# Patient Record
Sex: FEMALE | Race: WHITE | Employment: FULL TIME | URBAN - METROPOLITAN AREA
[De-identification: names, ages, dates, MRNs, and addresses within clinical notes are randomized per-mention and may not be internally consistent; named-entity substitution may affect disease eponyms.]

---

## 2017-06-08 ENCOUNTER — TELEPHONE (OUTPATIENT)
Dept: SURGERY | Facility: CLINIC | Age: 47
End: 2017-06-08

## 2017-06-08 RX ORDER — FLUCONAZOLE 150 MG/1
150 TABLET ORAL DAILY
Qty: 10 TABLET | Refills: 0 | Status: SHIPPED | OUTPATIENT
Start: 2017-06-08 | End: 2017-06-18

## 2019-01-03 ENCOUNTER — HOSPITAL (OUTPATIENT)
Dept: OTHER | Age: 49
End: 2019-01-03

## 2021-02-10 DIAGNOSIS — Z23 NEED FOR VACCINATION: ICD-10-CM

## 2021-05-10 ENCOUNTER — APPOINTMENT (OUTPATIENT)
Dept: CT IMAGING | Facility: HOSPITAL | Age: 51
End: 2021-05-10
Attending: EMERGENCY MEDICINE
Payer: COMMERCIAL

## 2021-05-10 ENCOUNTER — HOSPITAL ENCOUNTER (EMERGENCY)
Facility: HOSPITAL | Age: 51
Discharge: HOME | End: 2021-05-10
Attending: EMERGENCY MEDICINE
Payer: COMMERCIAL

## 2021-05-10 VITALS
WEIGHT: 192 LBS | HEIGHT: 72 IN | RESPIRATION RATE: 22 BRPM | HEART RATE: 90 BPM | BODY MASS INDEX: 26.01 KG/M2 | DIASTOLIC BLOOD PRESSURE: 74 MMHG | TEMPERATURE: 98 F | OXYGEN SATURATION: 100 % | SYSTOLIC BLOOD PRESSURE: 155 MMHG

## 2021-05-10 DIAGNOSIS — H81.10 BENIGN PAROXYSMAL POSITIONAL VERTIGO, UNSPECIFIED LATERALITY: ICD-10-CM

## 2021-05-10 DIAGNOSIS — J01.00 ACUTE MAXILLARY SINUSITIS, RECURRENCE NOT SPECIFIED: Primary | ICD-10-CM

## 2021-05-10 PROCEDURE — 70496 CT ANGIOGRAPHY HEAD: CPT | Performed by: EMERGENCY MEDICINE

## 2021-05-10 PROCEDURE — 80053 COMPREHEN METABOLIC PANEL: CPT | Performed by: EMERGENCY MEDICINE

## 2021-05-10 PROCEDURE — 93005 ELECTROCARDIOGRAM TRACING: CPT

## 2021-05-10 PROCEDURE — 93010 ELECTROCARDIOGRAM REPORT: CPT

## 2021-05-10 PROCEDURE — 99284 EMERGENCY DEPT VISIT MOD MDM: CPT

## 2021-05-10 PROCEDURE — 36415 COLL VENOUS BLD VENIPUNCTURE: CPT

## 2021-05-10 PROCEDURE — 85025 COMPLETE CBC W/AUTO DIFF WBC: CPT | Performed by: EMERGENCY MEDICINE

## 2021-05-10 RX ORDER — MECLIZINE HYDROCHLORIDE 25 MG/1
25 TABLET ORAL ONCE
Status: COMPLETED | OUTPATIENT
Start: 2021-05-10 | End: 2021-05-10

## 2021-05-10 RX ORDER — AMOXICILLIN 875 MG/1
875 TABLET, COATED ORAL 2 TIMES DAILY
Qty: 20 TABLET | Refills: 0 | Status: SHIPPED | OUTPATIENT
Start: 2021-05-10 | End: 2021-05-20

## 2021-05-10 RX ORDER — MECLIZINE HYDROCHLORIDE 25 MG/1
25 TABLET ORAL 3 TIMES DAILY PRN
Qty: 20 TABLET | Refills: 0 | Status: SHIPPED | OUTPATIENT
Start: 2021-05-10

## 2021-05-10 NOTE — ED INITIAL ASSESSMENT (HPI)
Vertigo with room spinning and nausea at approx 1330 / pt states she tried to sit down and rest but now head is pounding with vomiting / patient reports abnormal CT scan of head in the past winter but never followed up on it

## 2021-05-10 NOTE — ED PROVIDER NOTES
.  Patient Seen in: [facility] Emergency Department      History   Patient presents with:  Dizziness  Hypertension    Stated Complaint: dizzy, htn     HPI/Subjective:   HPI    51-year-old female complaint of dizzy patient describes that today she work negative except as noted above.     Physical Exam     ED Triage Vitals [05/10/21 1714]   BP (!) 155/105   Pulse 94   Resp 18   Temp 97.6 °F (36.4 °C)   Temp src Temporal   SpO2 97 %   O2 Device None (Room air)       Current:/74   Pulse 90   Temp 97.6 Normal EKG              CTA BRAIN ([id number])    Result Date: 5/10/2021  CONCLUSION:  Intracranial CT angiogram demonstrates no large branch occlusion or aneurysm. There is sinus inflammatory disease in left maxillary sinus.    Dictated by (CST): Tyrone Joseph

## 2021-07-01 ENCOUNTER — OFFICE VISIT (OUTPATIENT)
Dept: RHEUMATOLOGY | Facility: CLINIC | Age: 51
End: 2021-07-01
Payer: COMMERCIAL

## 2021-07-01 VITALS
WEIGHT: 198 LBS | DIASTOLIC BLOOD PRESSURE: 82 MMHG | RESPIRATION RATE: 16 BRPM | TEMPERATURE: 98 F | HEIGHT: 72 IN | SYSTOLIC BLOOD PRESSURE: 142 MMHG | BODY MASS INDEX: 26.82 KG/M2 | HEART RATE: 88 BPM

## 2021-07-01 DIAGNOSIS — I73.00 RAYNAUD'S DISEASE WITHOUT GANGRENE: ICD-10-CM

## 2021-07-01 DIAGNOSIS — M47.812 OSTEOARTHRITIS OF CERVICAL SPINE, UNSPECIFIED SPINAL OSTEOARTHRITIS COMPLICATION STATUS: ICD-10-CM

## 2021-07-01 DIAGNOSIS — R06.81 APNEA: ICD-10-CM

## 2021-07-01 DIAGNOSIS — F41.9 ANXIETY: ICD-10-CM

## 2021-07-01 DIAGNOSIS — M79.7 FIBROMYALGIA: ICD-10-CM

## 2021-07-01 DIAGNOSIS — R76.8 POSITIVE ANA (ANTINUCLEAR ANTIBODY): Primary | ICD-10-CM

## 2021-07-01 DIAGNOSIS — F32.A DEPRESSION, UNSPECIFIED DEPRESSION TYPE: ICD-10-CM

## 2021-07-01 DIAGNOSIS — H81.12 BENIGN PAROXYSMAL POSITIONAL VERTIGO OF LEFT EAR: ICD-10-CM

## 2021-07-01 PROCEDURE — 3008F BODY MASS INDEX DOCD: CPT | Performed by: INTERNAL MEDICINE

## 2021-07-01 PROCEDURE — 99244 OFF/OP CNSLTJ NEW/EST MOD 40: CPT | Performed by: INTERNAL MEDICINE

## 2021-07-01 PROCEDURE — 3079F DIAST BP 80-89 MM HG: CPT | Performed by: INTERNAL MEDICINE

## 2021-07-01 PROCEDURE — 3077F SYST BP >= 140 MM HG: CPT | Performed by: INTERNAL MEDICINE

## 2021-07-01 RX ORDER — DEXTROAMPHETAMINE SACCHARATE, AMPHETAMINE ASPARTATE MONOHYDRATE, DEXTROAMPHETAMINE SULFATE AND AMPHETAMINE SULFATE 3.75; 3.75; 3.75; 3.75 MG/1; MG/1; MG/1; MG/1
15 CAPSULE, EXTENDED RELEASE ORAL DAILY
COMMUNITY
Start: 2021-02-23 | End: 2021-11-08 | Stop reason: ALTCHOICE

## 2021-07-01 NOTE — PATIENT INSTRUCTIONS
For sleep study: Edward scheduling line to set the appointment up. Phone number is [phone]. Talk to Dr. Tamra Capone about effexor for hot flashes/anxiety;  Ok to wait after seeing psychiatry      Raynaud Disease  Your healthcare provider has told yo disease include:  · Women are more likely to get Raynaud disease than men. · Younger people are at higher risk, usually ages 13 to 27. · Living in colder climates increases risk. · Having a family member with Raynaud disease increases your risk.   · Unde vessels, such as calcium channel blockers. These may help relieve symptoms. · Nerve surgery. This is used for severe cases that don’t respond to other treatments. Surgery removes the nerves that surround the blood vessels in the hands and feet.  Without ne

## 2021-07-01 NOTE — PROGRESS NOTES
Rheumatology New Patient Note  =====================================================================================================      Date of visit: 7/1/2021  ? Patient presents with:  Establish Care: New pt.  Dr. Toya Rico referral. Nga Diane treated for insufficiency), or blood clots. 14 point ROS negative except noted above    Medications:  Amphetamine-Dextroamphet ER 15 MG Oral Capsule SR 24 Hr, Take 15 mg by mouth daily. , Disp: , Rfl:   methylphenidate 10 MG Oral Tab, Take 1 tablet by mouth 2 (two) midline with no lesions. The oral cavity is clear. Neck: Supple. No neck masses. No thyromegaly. No LAD, parotid or submandicular gland palpated.    CV: RRR, no mrg, S1/S2  PULM: CTAB, no wrr, easy effort  Abd: s/nt/nd  Extremities: No cyanosis, edema or SM, RNP, ANTIRNP, SMITHRNP  No results found for: SCL70, SCL, PNOXZHD79  No results found for: C3, C4  No results found for: DRVVT, LAINT, PTTLUPUS, LUPUSINTERP, LA, R1JJ4MTSGM, D0PI9RIBTA, O7DQTDDOYB, I5FVFQGZEB  No results found for: Edgard Garvin elevation.     ?  Plan:  -Positive MADYSON labs as below; repeat CRP  -Polysomnogram (PSG) to evaluate sleep disordered breathing given unrefreshed sleep and fibromyalgia  -refer to psych Naomiedebbie Barahona) for anxiety/depression  -provide exercises to greater troch plan of care, diagnosis, orders, and follow-up were discussed with the patient. Questions related to this recommended plan of care were answered. Thank you for referring this delightful patient to me. Please feel free to contact me with any questions.

## 2021-11-09 PROBLEM — A74.9 CHLAMYDIA: Status: ACTIVE | Noted: 2021-11-09

## 2022-03-15 ENCOUNTER — OFFICE VISIT (OUTPATIENT)
Dept: PAIN CLINIC | Facility: CLINIC | Age: 52
End: 2022-03-15
Payer: COMMERCIAL

## 2022-03-15 ENCOUNTER — TELEPHONE (OUTPATIENT)
Dept: PAIN CLINIC | Facility: CLINIC | Age: 52
End: 2022-03-15

## 2022-03-15 VITALS
HEART RATE: 92 BPM | BODY MASS INDEX: 25 KG/M2 | WEIGHT: 185 LBS | DIASTOLIC BLOOD PRESSURE: 80 MMHG | SYSTOLIC BLOOD PRESSURE: 110 MMHG | OXYGEN SATURATION: 98 %

## 2022-03-15 DIAGNOSIS — M54.12 CHRONIC RADICULAR CERVICAL PAIN: ICD-10-CM

## 2022-03-15 DIAGNOSIS — G89.29 CHRONIC RADICULAR CERVICAL PAIN: ICD-10-CM

## 2022-03-15 DIAGNOSIS — M54.6 LEFT-SIDED THORACIC BACK PAIN, UNSPECIFIED CHRONICITY: Primary | ICD-10-CM

## 2022-03-15 PROCEDURE — 3079F DIAST BP 80-89 MM HG: CPT | Performed by: NURSE PRACTITIONER

## 2022-03-15 PROCEDURE — 3074F SYST BP LT 130 MM HG: CPT | Performed by: NURSE PRACTITIONER

## 2022-03-15 PROCEDURE — 99204 OFFICE O/P NEW MOD 45 MIN: CPT | Performed by: NURSE PRACTITIONER

## 2022-03-15 RX ORDER — SOD SULF/POT CHLORIDE/MAG SULF 1.479 G
TABLET ORAL
COMMUNITY
End: 2022-03-15

## 2022-03-15 RX ORDER — METHYLPREDNISOLONE 4 MG/1
TABLET ORAL
Qty: 1 EACH | Refills: 0 | Status: SHIPPED | OUTPATIENT
Start: 2022-03-15

## 2022-03-15 RX ORDER — PREDNISONE 20 MG/1
20 TABLET ORAL 2 TIMES DAILY
COMMUNITY
Start: 2022-01-25 | End: 2022-03-15

## 2022-03-15 RX ORDER — ACETAMINOPHEN AND CODEINE PHOSPHATE 300; 30 MG/1; MG/1
TABLET ORAL
COMMUNITY
End: 2022-03-15

## 2022-03-15 RX ORDER — TINIDAZOLE 500 MG/1
TABLET ORAL
COMMUNITY
End: 2022-03-15

## 2022-03-15 NOTE — TELEPHONE ENCOUNTER
Pt bought in imaging disc from [employer] MRI of T-Spine has been uploaded to PACS.     Disc returned to pt

## 2022-03-16 ENCOUNTER — HOSPITAL ENCOUNTER (OUTPATIENT)
Dept: GENERAL RADIOLOGY | Age: 52
Discharge: HOME | End: 2022-03-16
Attending: NURSE PRACTITIONER
Payer: COMMERCIAL

## 2022-03-16 DIAGNOSIS — M54.6 LEFT-SIDED THORACIC BACK PAIN, UNSPECIFIED CHRONICITY: ICD-10-CM

## 2022-03-16 PROCEDURE — 72072 X-RAY EXAM THORAC SPINE 3VWS: CPT | Performed by: NURSE PRACTITIONER

## 2022-03-17 ENCOUNTER — TELEPHONE (OUTPATIENT)
Dept: NEUROLOGY | Facility: CLINIC | Age: 52
End: 2022-03-17

## 2022-03-17 NOTE — TELEPHONE ENCOUNTER
Question Answer   Anesthesia Type Local   Provider [facility]   Procedure Facet   Laterality/Level LEFT T5/6, T6/7 T7/8   Medical clearance requested (will send to Pain Navigator) No   Patient has Medicare coverage?  No

## 2022-03-17 NOTE — TELEPHONE ENCOUNTER
Prior authorization request completed for: LEFT T5/6, T6/7 T7/8 Facet injections   Authorization #  NO PRIOR AUTHORIZATION REQUIRED  Authorization dates: N/A  CPT codes approved: 16246 / 87046 / 85733  Number of visits/dates of service approved: 1  Physician: Dr. Em Santos   Location: [facility] to schedule

## 2022-03-28 ENCOUNTER — APPOINTMENT (OUTPATIENT)
Dept: GENERAL RADIOLOGY | Facility: HOSPITAL | Age: 52
End: 2022-03-28
Attending: ANESTHESIOLOGY
Payer: COMMERCIAL

## 2022-03-28 ENCOUNTER — HOSPITAL ENCOUNTER (OUTPATIENT)
Facility: HOSPITAL | Age: 52
Setting detail: HOSPITAL OUTPATIENT SURGERY
Discharge: HOME | End: 2022-03-28
Attending: ANESTHESIOLOGY | Admitting: ANESTHESIOLOGY
Payer: COMMERCIAL

## 2022-03-28 VITALS
OXYGEN SATURATION: 100 % | SYSTOLIC BLOOD PRESSURE: 157 MMHG | DIASTOLIC BLOOD PRESSURE: 96 MMHG | WEIGHT: 180 LBS | RESPIRATION RATE: 16 BRPM | HEART RATE: 75 BPM | TEMPERATURE: 98 F | BODY MASS INDEX: 24 KG/M2

## 2022-03-28 DIAGNOSIS — M47.894 THORACIC FACET JOINT SYNDROME: ICD-10-CM

## 2022-03-28 PROCEDURE — 3E0U3BZ INTRODUCTION OF ANESTHETIC AGENT INTO JOINTS, PERCUTANEOUS APPROACH: ICD-10-PCS | Performed by: ANESTHESIOLOGY

## 2022-03-28 PROCEDURE — 3E0U33Z INTRODUCTION OF ANTI-INFLAMMATORY INTO JOINTS, PERCUTANEOUS APPROACH: ICD-10-PCS | Performed by: ANESTHESIOLOGY

## 2022-03-28 RX ORDER — DIPHENHYDRAMINE HYDROCHLORIDE 50 MG/ML
50 INJECTION INTRAMUSCULAR; INTRAVENOUS ONCE AS NEEDED
OUTPATIENT
Start: 2022-03-28 | End: 2022-03-28

## 2022-03-28 RX ORDER — LIDOCAINE HYDROCHLORIDE 10 MG/ML
INJECTION, SOLUTION EPIDURAL; INFILTRATION; INTRACAUDAL; PERINEURAL
Status: DISCONTINUED | OUTPATIENT
Start: 2022-03-28 | End: 2022-03-28

## 2022-03-28 RX ORDER — METHYLPREDNISOLONE ACETATE 40 MG/ML
INJECTION, SUSPENSION INTRA-ARTICULAR; INTRALESIONAL; INTRAMUSCULAR; SOFT TISSUE
Status: DISCONTINUED | OUTPATIENT
Start: 2022-03-28 | End: 2022-03-28

## 2022-03-28 RX ORDER — ONDANSETRON 2 MG/ML
4 INJECTION INTRAMUSCULAR; INTRAVENOUS ONCE AS NEEDED
OUTPATIENT
Start: 2022-03-28 | End: 2022-03-28

## 2022-03-30 NOTE — OPERATIVE REPORT
[facility]  Operative Report  3/28/2022     Jadon Philip Patient Status:  Hospital Outpatient Surgery    1970 MRN [id number]   Location [id number] [place] [address] Attending No att. providers found   Hosp Day # 0 PCP Kim Cooley DO     Indication: Arian Ramirez is a 46year old female patient with chronic thoracic pain failed conservative treatment with medication and physical therapy was here for diagnostic thoracic facet joint injection. Preoperative Diagnosis:  Thoracic facet joint syndrome [M47.894]    Postoperative Diagnosis: Same as above. Procedure performed: Left diagnostic thoracic facet joint injection at T5-T6, T6-T7 and T7-T8 level under fluoroscopy. Anesthesia: Local.    EBL: Less than 1 ml. Procedure Description:   After reviewing the patient's history and performing a focused physical examination, the diagnosis was confirmed and contraindications such as infection and coagulopathy were ruled out. Following review of potential side effects and complications, including but not necessarily limited to infection, allergic reaction, local tissue breakdown, nerve injury, and paresis, the patient indicated they understood and agreed to proceed. After obtaining the informed consent, the patient was brought to the procedure room and monitored. The vital signs were monitored and recorded by an experienced RN. The patient was brought to the procedure room and placed in prone position. ASA standard monitors were placed. Vitals were noted. NPO status was checked. Consent was obtained. The back was prepped and draped. Intravenous sedation was given with propofol. The thoracic facets were identified under direct fluoroscopic vision. The needles were placed superolateral to the articular processes at the left T5-T6, T6-T7 and T7-T8 level. Each needle was injected from a mixture of 40 mg Depo-Medrol and 1% Lidocaine 10 mL.   Each joint was injected with 1-2 mL of the solution. The patient tolerated the procedure very well. The patient has complete understanding of the risks and benefits of the procedure. Complications: None. Follow up: The patient will be followed in the pain clinic as needed basis.     Lindy Buenrostro MD

## 2022-04-11 ENCOUNTER — OFFICE VISIT (OUTPATIENT)
Dept: PAIN CLINIC | Facility: CLINIC | Age: 52
End: 2022-04-11
Payer: COMMERCIAL

## 2022-04-11 VITALS — DIASTOLIC BLOOD PRESSURE: 86 MMHG | SYSTOLIC BLOOD PRESSURE: 160 MMHG | HEART RATE: 86 BPM | OXYGEN SATURATION: 100 %

## 2022-04-11 DIAGNOSIS — M54.12 CHRONIC RADICULAR CERVICAL PAIN: ICD-10-CM

## 2022-04-11 DIAGNOSIS — G89.29 CHRONIC LEFT SHOULDER PAIN: Primary | ICD-10-CM

## 2022-04-11 DIAGNOSIS — M25.512 CHRONIC LEFT SHOULDER PAIN: Primary | ICD-10-CM

## 2022-04-11 DIAGNOSIS — G89.29 CHRONIC RADICULAR CERVICAL PAIN: ICD-10-CM

## 2022-04-11 DIAGNOSIS — M54.2 CERVICALGIA: ICD-10-CM

## 2022-04-11 PROCEDURE — 3077F SYST BP >= 140 MM HG: CPT | Performed by: NURSE PRACTITIONER

## 2022-04-11 PROCEDURE — 99214 OFFICE O/P EST MOD 30 MIN: CPT | Performed by: NURSE PRACTITIONER

## 2022-04-11 PROCEDURE — 3079F DIAST BP 80-89 MM HG: CPT | Performed by: NURSE PRACTITIONER

## 2022-04-11 RX ORDER — MELOXICAM 15 MG/1
15 TABLET ORAL DAILY
Qty: 15 TABLET | Refills: 0 | Status: SHIPPED | OUTPATIENT
Start: 2022-04-11

## 2022-04-11 NOTE — PROGRESS NOTES
Last procedure: THORACIC FACET JOINT INJECTION DIAGNOSTIC LEFT T5/6, T6/7, T7/8 with local  Date: 3/28/22  Percentage of relief obtained: 10%?  Pt unsure  Duration of relief: current    Current Pain Score: 2/10

## 2022-04-12 ENCOUNTER — TELEPHONE (OUTPATIENT)
Dept: PAIN CLINIC | Facility: CLINIC | Age: 52
End: 2022-04-12

## 2022-04-12 NOTE — TELEPHONE ENCOUNTER
Spoke with Dr. Tereza Lees and d/w him patient's c/o pain along the left scapular region. Dr. Tereza Lees recommended left levator scapular trigger point injection. Left message for patient re: the plan. Will request auth for this trigger point injection.

## 2022-04-13 ENCOUNTER — TELEPHONE (OUTPATIENT)
Dept: NEUROLOGY | Facility: CLINIC | Age: 52
End: 2022-04-13

## 2022-04-13 NOTE — TELEPHONE ENCOUNTER
Prior authorization request completed for: Left TPI   Authorization # NO PRIOR AUTHORIZATION REQUIRED  Authorization dates: N/A  CPT codes approved: 45124 / 16640  Number of visits/dates of service approved: 1  Physician: Dr. Carlos Jovel  Location: [facility]     Call Ref#: [id number]  Representative Name: N/A    Patient can be scheduled. Routed to Navigator.

## 2022-04-14 NOTE — TELEPHONE ENCOUNTER
Question Answer   Anesthesia Type Local   Provider Ksenia Slight   Location Lab   Procedure Other (please add comment)   Medical clearance requested (will send to Pain Navigator) No   Patient has Medicare coverage?  No   Comments (Please list entire procedure name here.) [facility]

## 2023-07-06 ENCOUNTER — IMAGING SERVICES (OUTPATIENT)
Dept: GENERAL RADIOLOGY | Age: 53
End: 2023-07-06

## 2023-07-06 ENCOUNTER — APPOINTMENT (OUTPATIENT)
Dept: GENERAL RADIOLOGY | Age: 53
End: 2023-07-06

## 2023-07-06 DIAGNOSIS — J18.9 PNEUMONIA: ICD-10-CM

## 2023-07-06 DIAGNOSIS — J18.9 PNEUMONIA: Primary | ICD-10-CM

## 2023-07-06 PROCEDURE — 71046 X-RAY EXAM CHEST 2 VIEWS: CPT | Performed by: RADIOLOGY

## (undated) DIAGNOSIS — M79.18 MYOFASCIAL PAIN ON LEFT SIDE: Primary | ICD-10-CM

## (undated) DIAGNOSIS — M47.894 THORACIC FACET JOINT SYNDROME: Primary | ICD-10-CM

## (undated) DEVICE — NEEDLE SPINAL 22X3-1/2 BLK

## (undated) DEVICE — REMOVER DURAPREP 3M

## (undated) DEVICE — BANDAID COVERLET 1X3

## (undated) DEVICE — MARKER SKIN 2 TIP

## (undated) DEVICE — GLOVE SURG SENSICARE SZ 6-1/2

## (undated) DEVICE — GLOVE SURG SENSICARE SZ 7-1/2

## (undated) NOTE — LETTER
7/2/2021        Referring:  Danya Winters, [address]  [address]      Dear Dr. Osmin Lopes:    Thank you for referring your patient to me for an evaluation. Please see my attached note for my findings and recommendations.  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 Waking up at night with hot flashes. Has BPPV. Doing anti-inflammatory diet. Had Right tennis elbow in the past.     +Dry eye/mouth: dry mouth is intermittent   ? Raynauds: tips of fingers turn white. Been going on for 5-10 years.  Not to Vaping Use: Never used    Alcohol use:  Yes      Alcohol/week: 0.0 standard drinks      Comment: socially     Drug use: No    ?  Allergies:  No Known Allergies      Objective     07/01/21  1110   BP: 142/82   Pulse: 88   Resp: 16   Temp: 98.3 °F (36.8 ° ANIONGAP 4 05/10/2021    GFR 77 04/16/2016    GFRNAA 89 05/10/2021    GFRAA 103 05/10/2021    CA 9.0 05/10/2021    OSMOCALC 284 05/10/2021    ALKPHO 84 05/10/2021    AST 9 (L) 05/10/2021    ALT 27 05/10/2021    BILT 0.5 05/10/2021    TP 7.9 05/10/2021    A gangrene  Apnea    Patient with possible Raynauds. Has some mild dry eye. Long history of fibromyalgia which is an active today. Patient is keeping very active which is helping. Sleep is not great with possible apneas.   Depression/anxiety is up-and-charity Future    Benign paroxysmal positional vertigo of left ear    Fibromyalgia  -     OP REFERRAL TO PSYCHIATRY  -     [facility]  -     GENERAL SLEEP STUDY TRANSCRIPTION;  Future    Anxiety  -     OP REFERRAL TO PSYCHIATRY